# Patient Record
Sex: FEMALE | Race: WHITE | NOT HISPANIC OR LATINO | Employment: PART TIME | URBAN - METROPOLITAN AREA
[De-identification: names, ages, dates, MRNs, and addresses within clinical notes are randomized per-mention and may not be internally consistent; named-entity substitution may affect disease eponyms.]

---

## 2018-05-07 ENCOUNTER — OFFICE VISIT (OUTPATIENT)
Dept: OTHER | Facility: IMAGING CENTER | Age: 23
End: 2018-05-07
Payer: COMMERCIAL

## 2018-05-07 VITALS
HEART RATE: 78 BPM | TEMPERATURE: 97.6 F | OXYGEN SATURATION: 100 % | DIASTOLIC BLOOD PRESSURE: 64 MMHG | WEIGHT: 186.07 LBS | BODY MASS INDEX: 29.2 KG/M2 | RESPIRATION RATE: 14 BRPM | SYSTOLIC BLOOD PRESSURE: 110 MMHG | HEIGHT: 67 IN

## 2018-05-07 DIAGNOSIS — F41.9 ANXIETY: ICD-10-CM

## 2018-05-07 DIAGNOSIS — Z00.00 HEALTHCARE MAINTENANCE: ICD-10-CM

## 2018-05-07 DIAGNOSIS — D24.2 BENIGN PHYLLODES TUMOR OF BREAST, LEFT: ICD-10-CM

## 2018-05-07 DIAGNOSIS — E55.9 VITAMIN D DEFICIENCY: ICD-10-CM

## 2018-05-07 PROCEDURE — 99205 OFFICE O/P NEW HI 60 MIN: CPT | Performed by: INTERNAL MEDICINE

## 2018-05-07 ASSESSMENT — PATIENT HEALTH QUESTIONNAIRE - PHQ9
CLINICAL INTERPRETATION OF PHQ2 SCORE: 1
SUM OF ALL RESPONSES TO PHQ QUESTIONS 1-9: 7
5. POOR APPETITE OR OVEREATING: 0 - NOT AT ALL

## 2018-05-07 NOTE — LETTER
[facility]  No primary care provider on file.  No primary provider on file.  Fax: [phone]   Authorization for Release/Disclosure of   Protected Health Information   Name: STONEY ACEVEDO : 1995 SSN: [id number]   Address: [address] [place] [address]   [place] [address] Phone:    [phone] (home)    I authorize the entity listed below to release/disclose the PHI below to:   [facility]/No primary care provider on file. and Soniya Gannon D.O.   Provider or Entity Name: Lima Martinez M.D.     Address   City, State, Zip   Phone:  [phone]    Fax:  [phone]   Reason for request: continuity of care   Information to be released:    [  ] LAST COLONOSCOPY,  including any PATH REPORT and follow-up  [  ] LAST FIT/COLOGUARD RESULT [  ] LAST DEXA  [  ] LAST MAMMOGRAM  [  ] LAST PAP  [  ] LAST LABS [  ] RETINA EXAM REPORT  [  ] IMMUNIZATION RECORDS  [ X ] Release all info      [  ] Check here and initial the line next to each item to release ALL health information INCLUDING  _____ Care and treatment for drug and / or alcohol abuse  _____ HIV testing, infection status, or AIDS  _____ Genetic Testing    DATES OF SERVICE OR TIME PERIOD TO BE DISCLOSED: _____________  I understand and acknowledge that:  * This Authorization may be revoked at any time by you in writing, except if your health information has already been used or disclosed.  * Your health information that will be used or disclosed as a result of you signing this authorization could be re-disclosed by the recipient. If this occurs, your re-disclosed health information may no longer be protected by State or Federal laws.  * You may refuse to sign this Authorization. Your refusal will not affect your ability to obtain treatment.  * This Authorization becomes effective upon signing and will  on (date) __________.      If no date is indicated, this Authorization will  one (1) year from the signature date.    Name: Stoney Acevedo    Signature:   Date:     5/7/2018       PLEASE FAX REQUESTED RECORDS BACK TO: [phone]

## 2018-05-07 NOTE — ASSESSMENT & PLAN NOTE
S/P excision surgery x 2 Dec 2016 by Dr. Martinez (surgery). Family history of breast cancer in maternal grandmother.

## 2018-05-07 NOTE — PROGRESS NOTES
"Chief Complaint   Patient presents with   • Establish Care   • Anxiety     no previous treatments, more severe over the last 6 months      Hoa Acevedo is a 22 y.o. female who usually sees No primary care provider on file. presents today to establish care at [facility] and to discuss anxiety    HPI:  Anxiety  Patient reports a history of anxiety which has been going on most of her life. Patient experiences excessive worrying daily. Used to have panic attacks. During high school she states that she would trust problem with drinking and partying. She says she has realized that this is not a good way to cope and has cut back on drinking. Last month feels her anxiety has been more severe. Emotional state has been up and down with some panic attacks. Possible trigger including a situation with one of her friends who didn't seem to respond well when she was talking about her anxiety and other issues. Patient's grandmother also recently diagnosed with a possible stroke. Patient also has been taking on more responsibilities at work. She does experience some restlessness and muscle tension in her neck as well as sleep disturbance-feels that she is not getting good sleep. She has mentioned this to a health provider who had done some labs on her which she remembers as being negative except for a vitamin D deficiency. She feels that she is a hypochondriac at times. She has also switched to birth control pills-was previously on IUD.    Healthcare maintenance  PAP 2018, Dr. Thorne  Lifestyle  Exercise: Not regular - used to do yoga. On feet all day at work, up and down lots of stairs, heavy lifting.   Diet: Breakfast hit or miss, Lunch (hard-boiled eggs and fruit or veggies), Dinner (protein, side of veggies or rice/potatoes), snack (work around food so she can snack on slice of pizza or whatever special they test out that day), beverages (coffee 1-2c/day, teas).   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  Relationships: live with parents, stay with boyfriend a lot, solid friend group, lots of outlet and good support. Has a valarie. Doesn't know why she is struggling. She is open in spirituality.   Current Stressors: too much work, not enough money, no motivation or time for self care.    Benign phyllodes tumor of breast, left  S/P excision surgery x 2 Dec 2016 by Dr. Martinez (surgery). Family history of breast cancer in maternal grandmother.    Current medicines (including changes today)  Current Outpatient Prescriptions   Medication Sig Dispense Refill   • Norethin-Eth Estrad-Fe Biphas (LO LOESTRIN FE PO) Take  by mouth.     • PREBIOTIC VAGINAL PRODUCTS VA Insert  in vagina.       No current facility-administered medications for this visit.      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  She  has a past surgical history that includes lumpectomy and dental extraction(s).  Social History   Substance Use Topics   • Smoking status: Current Some Day Smoker     Types: Cigarettes   • Smokeless tobacco: Never Used      Comment: couple time every other week , started at 15 yr   • Alcohol use Yes      Comment: 1 drink per week and more socially      Family History   Problem Relation Age of Onset   • Breast Cancer Maternal Grandmother 78     also possible stroke   • Anemia Mother    • GI Father      intestinal issues   • Other Sister      miscarriage   • No Known Problems Brother    • Other Maternal Grandfather      tumors in heart   • Lung Cancer Paternal Grandmother    • Lung Cancer Paternal Grandfather      Family Status   Relation Status   • Maternal Grandmother Alive    Breast cancer at age 78      Social History     Social History Narrative   • No narrative on file     ROS  Constitutional: Negative for fever, chills, weight loss  "and malaise/fatigue.   HENT: Negative for ear pain, nosebleeds, congestion, sore throat and neck pain.    Eyes: Negative for blurred vision.   Respiratory: Negative for cough, sputum production, shortness of breath and wheezing.    Cardiovascular: Negative for chest pain, palpitations, orthopnea and leg swelling.   Gastrointestinal: Negative for heartburn, nausea, vomiting and abdominal pain.   Genitourinary: Negative for dysuria, urgency and frequency.   Musculoskeletal: Negative for myalgias, back pain and joint pain.   Skin: Negative for rash and itching.   Neurological: Negative for dizziness, tingling, tremors, sensory change, focal weakness and headaches.   Endo/Heme/Allergies: Does not bruise/bleed easily.   Psychiatric/Behavioral: As per hpi.  All other systems reviewed and are negative except as in HPI.  Labs reviewed with patient:     Objective:   Blood pressure 110/64, pulse 78, temperature 36.4 °C (97.6 °F), resp. rate 14, height 1.702 m (5' 7\"), weight 84.4 kg (186 lb 1.1 oz), last menstrual period 04/10/2018, SpO2 100 %, not currently breastfeeding. Body mass index is 29.14 kg/m².  Physical Exam:  Constitutional: Alert, no distress.  Skin: Warm, dry, good turgor, no rashes in visible areas.  Eye: Equal, round and reactive, conjunctiva clear, lids normal.  ENMT: Lips without lesions, good dentition, oropharynx clear.  Neck: Trachea midline, no masses, no thyromegaly.  Respiratory: Unlabored respiratory effort, lungs clear to auscultation, no wheezes, no ronchi.  Cardiovascular: Normal S1, S2, no murmur, no edema.  Abdomen: Soft, non-tender, no masses, no hepatosplenomegaly.  Psych: Alert and oriented x3, normal affect and mood.    Assessment and Plan:   The following treatment plan was discussed  1. Anxiety  Meets DSM-5 criteria for RACHAEL. R/O metabolic condition that can mimic anxiety. Patient declines prescription medications. Discussed adding a regular exercise routine back into her schedule.  " Discussed maximizing nutrition to include foods rich in omega 3-fatty acids. Advised starting a B-complex vitamin with folic acid as B6 can be depleted in women taking OCP which could contribute to mood changes. Discussed avoiding caffeine and excess ETOH consumption of which long-term use has been associated with anxiety - switch to green tea. Discussed maintaining work-life balance, patient is aware she has a tendency to take on more than she needs to handle and will keep this in mind when she makes her future schedules. She is looking forward to vacation in June. Discussed incorporating daily mind-body therapy such as meditation - information about guided imagery given. Discussed of kava kava for 4-6 weeks or valerian root for 6 wks.   Consider referral to  specialist if symptoms not improving or worsening.   Acupuncture treatment in office today: GV 20, Arian araujo, B/L Eddie victor, B/L edvin ventura rx, Sp 3.2, Kd 27, Kd 7, Oketsu rx  - COMP METABOLIC PANEL; Future  - TSH WITH REFLEX TO FT4; Future    2. Benign phyllodes tumor of breast, left  F/U with Dr. Martinez as scheduled.     3. Vitamin D deficiency  Recheck vitamin D levels. Had been low in the past. Not currently taking a supplement.  - VITAMIN D,25 HYDROXY; Future    4. Healthcare maintenance  Discussed CDC recommendations for immunizations and USPSTF guidelines for screening exams. Advised attending Food is Medicine lectures - intro lecture.   - COMP METABOLIC PANEL; Future  - TSH WITH REFLEX TO FT4; Future  - CBC WITH DIFFERENTIAL; Future    Records requested.  Followup: Return in about 2 months (around 7/7/2018), or if symptoms worsen or fail to improve, for establish appointment with new provider..     Spent 60 minutes in face-to-tace patient contact in which greater than 50% of the visit was spent in counseling and coordination of care including anxiety medication management options. Discussed sleep hygiene in detail. Discussed the benefits and utility of  acupuncture.  I answered patient questions about efficacy, safety, and what to expect during a treatment, and the likely number of treatments needed. We also reviewed PMH, family history, and each of her chronic diagnoses in regards to current management, specialty physicians, symptom control, and future planning.  Please refer to assessment and plan/discussion/recommendations for additional details.          I have worked with consultants from the vendor as well as technical experts from [employer] to optimize the interface. I have made every reasonable attempt to correct obvious errors, but I expect that there are errors of grammar and possibly content that I did not discover before finalizing the note.

## 2018-05-07 NOTE — LETTER
[facility]  No primary care provider on file.  No primary provider on file.  Fax: [phone]   Authorization for Release/Disclosure of   Protected Health Information   Name: STONEY LAUGHLIN : 1995 SSN: [id number]   Address: [address] [place] [address] [address] [place] [address] Phone:    [phone] (home)    I authorize the entity listed below to release/disclose the PHI below to:   [facility]/No primary care provider on file. and Soniya Gannon D.O.   Provider or Entity Name: Eden Thorne M.D.   Address   City, State, Zip   Phone:      Fax:  [phone]   Reason for request: continuity of care   Information to be released:    [  ] LAST COLONOSCOPY,  including any PATH REPORT and follow-up  [  ] LAST FIT/COLOGUARD RESULT [  ] LAST DEXA  [  ] LAST MAMMOGRAM  [  ] LAST PAP  [  ] LAST LABS [  ] RETINA EXAM REPORT  [  ] IMMUNIZATION RECORDS  [  ] Release all info      [  ] Check here and initial the line next to each item to release ALL health information INCLUDING  _____ Care and treatment for drug and / or alcohol abuse  _____ HIV testing, infection status, or AIDS  _____ Genetic Testing    DATES OF SERVICE OR TIME PERIOD TO BE DISCLOSED: _____________  I understand and acknowledge that:  * This Authorization may be revoked at any time by you in writing, except if your health information has already been used or disclosed.  * Your health information that will be used or disclosed as a result of you signing this authorization could be re-disclosed by the recipient. If this occurs, your re-disclosed health information may no longer be protected by State or Federal laws.  * You may refuse to sign this Authorization. Your refusal will not affect your ability to obtain treatment.  * This Authorization becomes effective upon signing and will  on (date) __________.      If no date is indicated, this Authorization will  one (1) year from the signature date.    Name: Stoney Laughlin    Signature:   Date:          5/7/2018       PLEASE FAX REQUESTED RECORDS BACK TO: [phone]

## 2018-05-07 NOTE — PATIENT INSTRUCTIONS
What can I do to improve my insomnia? -- You can follow good “sleep hygiene.” That means that you:    ?Sleep only long enough to feel rested and then get out of bed  ?Go to bed and get up at the same time every day  ?Do not try to force yourself to sleep. If you can't sleep, get out of bed and try again later.  ?Have coffee, tea, and other foods that have caffeine only in the morning  ?Avoid alcohol in the late afternoon, evening, and bedtime  ?Avoid smoking, especially in the evening  ?Keep your bedroom dark, cool, quiet, and free of reminders of work or other things that cause you stress  ?Solve problems you have before you go to bed  ?Exercise several days a week, but not right before bed  ?Avoid looking at phones or reading devices (“e-books”) that give off light before bed. This can make it harder to fall asleep.    Other things that can improve sleep include:  ?Relaxation therapy, in which you focus on relaxing all the muscles in your body 1 by 1  ?Working with a counselor or psychologist to deal with the problems that might be causing poor sleep.

## 2018-05-07 NOTE — LETTER
[facility]  No primary care provider on file.  No primary provider on file.  Fax: [phone]   Authorization for Release/Disclosure of   Protected Health Information   Name: STONEY ACEVEDO : 1995 SSN: [id number]   Address: [address] [place] [address] [address] [place] [address] Phone:    [phone] (home)    I authorize the entity listed below to release/disclose the PHI below to:   [facility]/No primary care provider on file. and Soniya Gannon D.O.   Provider or Entity Name:  [facility]    Address   City, [facility]   Phone:      Fax:     Reason for request: continuity of care   Information to be released:    [  ] LAST COLONOSCOPY,  including any PATH REPORT and follow-up  [  ] LAST FIT/COLOGUARD RESULT [  ] LAST DEXA  [X] LAST MAMMOGRAM  [  ] LAST PAP  [  ] LAST LABS [  ] RETINA EXAM REPORT  [  ] IMMUNIZATION RECORDS  [ X ] Release all info      [  ] Check here and initial the line next to each item to release ALL health information INCLUDING  _____ Care and treatment for drug and / or alcohol abuse  _____ HIV testing, infection status, or AIDS  _____ Genetic Testing    DATES OF SERVICE OR TIME PERIOD TO BE DISCLOSED: _____________  I understand and acknowledge that:  * This Authorization may be revoked at any time by you in writing, except if your health information has already been used or disclosed.  * Your health information that will be used or disclosed as a result of you signing this authorization could be re-disclosed by the recipient. If this occurs, your re-disclosed health information may no longer be protected by State or Federal laws.  * You may refuse to sign this Authorization. Your refusal will not affect your ability to obtain treatment.  * This Authorization becomes effective upon signing and will  on (date) __________.      If no date is indicated, this Authorization will  one (1) year from the signature date.    Name: Stoney Acevedo    Signature:   Date:     2018            PLEASE FAX REQUESTED RECORDS BACK TO: [phone]

## 2018-05-07 NOTE — ASSESSMENT & PLAN NOTE
Patient reports a history of anxiety which has been going on most of her life. Patient experiences excessive worrying daily. Used to have panic attacks. During high school she states that she would trust problem with drinking and partying. She says she has realized that this is not a good way to cope and has cut back on drinking. Last month feels her anxiety has been more severe. Emotional state has been up and down with some panic attacks. Possible trigger including a situation with one of her friends who didn't seem to respond well when she was talking about her anxiety and other issues. Patient's grandmother also recently diagnosed with a possible stroke. Patient also has been taking on more responsibilities at work. She does experience some restlessness and muscle tension in her neck as well as sleep disturbance-feels that she is not getting good sleep. She has mentioned this to a health provider who had done some labs on her which she remembers as being negative except for a vitamin D deficiency. She feels that she is a hypochondriac at times. She has also switched to birth control pills-was previously on IUD.

## 2018-05-07 NOTE — ASSESSMENT & PLAN NOTE
"PAP 2018, Dr. Thorne  Lifestyle  Exercise: Not regular - used to do yoga. On feet all day at work, up and down lots of stairs, heavy lifting.   Diet: Breakfast hit or miss, Lunch (hard-boiled eggs and fruit or veggies), Dinner (protein, side of veggies or rice/potatoes), snack (work around food so she can snack on slice of pizza or whatever special they test out that day), beverages (coffee 1-2c/day, teas).   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  Relationships: live with parents, stay with boyfriend a lot, solid friend group, lots of outlet and good support. Has a valarie. Doesn't know why she is struggling. She is open in spirituality.   Current Stressors: too much work, not enough money, no motivation or time for self care.  "

## 2018-06-04 ENCOUNTER — TELEPHONE (OUTPATIENT)
Dept: OTHER | Facility: IMAGING CENTER | Age: 23
End: 2018-06-04

## 2018-06-04 NOTE — TELEPHONE ENCOUNTER
----- Message from Jacquelin Powell sent at 6/3/2018  3:42 PM PDT -----      ----- Message -----  From: Soniya Gannon D.O.  Sent: 6/1/2018   4:56 PM  To: [facility]    Vitamin D is low at 22 - advise starting vitamin D3 supplement 2000 IU/day. Other labs are within normal limits.

## 2019-01-03 ENCOUNTER — OFFICE VISIT (OUTPATIENT)
Dept: OTHER | Facility: IMAGING CENTER | Age: 24
End: 2019-01-03
Payer: COMMERCIAL

## 2019-01-03 VITALS
BODY MASS INDEX: 26.71 KG/M2 | HEART RATE: 80 BPM | HEIGHT: 67 IN | RESPIRATION RATE: 14 BRPM | SYSTOLIC BLOOD PRESSURE: 110 MMHG | OXYGEN SATURATION: 96 % | TEMPERATURE: 99.2 F | DIASTOLIC BLOOD PRESSURE: 66 MMHG | WEIGHT: 170.19 LBS

## 2019-01-03 DIAGNOSIS — E55.9 VITAMIN D DEFICIENCY: ICD-10-CM

## 2019-01-03 DIAGNOSIS — R68.82 LOW LIBIDO: ICD-10-CM

## 2019-01-03 DIAGNOSIS — F41.9 ANXIETY: ICD-10-CM

## 2019-01-03 PROCEDURE — 99214 OFFICE O/P EST MOD 30 MIN: CPT | Performed by: INTERNAL MEDICINE

## 2019-01-03 RX ORDER — CHOLECALCIFEROL (VITAMIN D3) 125 MCG
CAPSULE ORAL
COMMUNITY

## 2019-01-03 ASSESSMENT — PATIENT HEALTH QUESTIONNAIRE - PHQ9
SUM OF ALL RESPONSES TO PHQ QUESTIONS 1-9: 7
5. POOR APPETITE OR OVEREATING: 0 - NOT AT ALL
CLINICAL INTERPRETATION OF PHQ2 SCORE: 1

## 2019-01-03 NOTE — ASSESSMENT & PLAN NOTE
Interval changes: Overall stable however and not worsening. States that she has found that her anxiety seems to be cyclical and worse prior to menstrual period when she is also more irritable. Has cut back on caffeine but will still have more caffeine when she has busy work day. Using more green tea and essential oils which she feels has helped. She knows her work schedule is sometimes stressful resulting in inconsistent sleep.  Off birth control pills- was on IUD October 2015 which was removed in April last year.  She decided to stop taking it in October.  Currently using condoms for birth control.    She has not had funds to start regular acupuncture but is interested. She has also been taking time to do more exercise recently and also journaling which she finds helpful.  She notes that meditation is very difficult for her still.

## 2019-01-03 NOTE — PROGRESS NOTES
Chief Complaint   Patient presents with   • Anxiety     still present, goes in waves, possible libido    • Vitamin D Deficiency     has been taking vitamin D3     Subjective:   Hoa Acevedo is a 23 y.o. female here today for multiple problems as listed below.      Anxiety  Interval changes: Overall stable however and not worsening. States that she has found that her anxiety seems to be cyclical and worse prior to menstrual period when she is also more irritable. Has cut back on caffeine but will still have more caffeine when she has busy work day. Using more green tea and essential oils which she feels has helped. She knows her work schedule is sometimes stressful resulting in inconsistent sleep.  Off birth control pills- was on IUD October 2015 which was removed in April last year.  She decided to stop taking it in October.  Currently using condoms for birth control.    She has not had funds to start regular acupuncture but is interested. She has also been taking time to do more exercise recently and also journaling which she finds helpful.  She notes that meditation is very difficult for her still.      Vitamin D deficiency  Started vitamin D3 2000 international units daily since May 2018 when levels were low.    She has been tracking her cycle since October-has been usually coming on time within 3 days.  Also aware of other changes such as increasing cervical mucus around time of ovulation.  This was not as apparent when she was on birth control pills. No menorrhagia or cramping. Continues to have low libido however. Some vaginal dryness with lengthy intercourse. Monogamous relationship. Finds difficulty initiating intercourse due to low libido and this seems out of character for her.  She feels that her work schedule will not change and may be a contributing factor as her sleep is inconsistent and will have fatigue which affects her libido.  Recently purchased herbal tincture with jaswinder giron damaina,  "ginseng proprietary blend but has not started it yet.    5/30/2018  Vitamin D 25-Hydroxy 22  Cbc, cmp, tsh wnl    Current medicines (including changes today)  Current Outpatient Prescriptions   Medication Sig Dispense Refill   • Cholecalciferol (VITAMIN D3) 2000 units Tab Take  by mouth.     • Norethin-Eth Estrad-Fe Biphas (LO LOESTRIN FE PO) Take  by mouth.     • PREBIOTIC VAGINAL PRODUCTS VA Insert  in vagina.       No current facility-administered medications for this visit.      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    ROS   No chest pain, no shortness of breath, no abdominal pain, no diarrhea/constipation, no urinary symptoms.     Objective:     Blood pressure 110/66, pulse 80, temperature 37.3 °C (99.2 °F), temperature source Temporal, resp. rate 14, height 1.702 m (5' 7\"), weight 77.2 kg (170 lb 3.1 oz), SpO2 96 %, not currently breastfeeding. Body mass index is 26.66 kg/m².   Physical Exam:  Alert, oriented in no acute distress.  Eye contact is good, speech goal directed, affect calm  HEENT: conjunctiva non-injected, sclera non-icteric.   Neck: No adenopathy or masses in the neck or supraclavicular regions.  Lungs: clear to auscultation bilaterally with good excursion.  CV: regular rate and rhythm.  Ext: no edema, color normal, vascularity normal, temperature normal    Assessment and Plan:   The following treatment plan was discussed  1. Anxiety   Overall stable.  Patient's goal is to avoid prescription medications.  She has been exercising regularly and doing some journaling-advised her to continue.  Continue cutting back on caffeine. If symptoms cyclical, may be related to premenstrual syndrome and addressing this may also help - see #2. Would advise trial of acupuncture but patient states she doesn't have the funds for this for a few months.    2. Low libido   Reviewed ingredients of herbal proprietary blend on " [employer] - main ingredients do have preliminary clinical evidence for effectiveness. Advise trial for 3-4 wks. Discussed use of essential oil blends which may also be helpful. Consider trial of chaste tree berry and saffron if still having symptoms.    3. Vitamin D deficiency   Continue vitamin D supplement 2000 international units daily.  Recheck levels. VITAMIN D,25 HYDROXY     Followup: Return in about 2 months (around 3/3/2019), or if symptoms worsen or fail to improve, for follow-up with PCP.    Spent 25 minutes in face-to-tace patient contact in which greater than 50% of the visit was spent in counseling and coordination of care including reviewing herbal supplement ingredients with patient, discussed benefits of journaling, benefits of acupuncture, assessing patients goals. Please refer to assessment and plan/discussion/recommendations for additional details.        Please note that dictation has been dictated using voice recognition soft ware. I have made every reasonable attempt to correct obvious errors, but I expect that there are errors of grammar and possibly content that I did not discover before finalizing the note.

## 2019-01-21 ENCOUNTER — TELEPHONE (OUTPATIENT)
Dept: OTHER | Facility: IMAGING CENTER | Age: 24
End: 2019-01-21

## 2019-01-21 NOTE — TELEPHONE ENCOUNTER
Voicemail received from pt inqiuring about typhoid/hep A vaccine for upcoming travel to [place]. Returned call to pt, but left voicemail. Hep a vaccine available at our office.

## 2019-01-22 ENCOUNTER — NON-PROVIDER VISIT (OUTPATIENT)
Dept: OTHER | Facility: IMAGING CENTER | Age: 24
End: 2019-01-22
Payer: COMMERCIAL

## 2019-01-22 DIAGNOSIS — Z23 NEED FOR HEPATITIS A VACCINATION: ICD-10-CM

## 2019-01-22 PROCEDURE — 90632 HEPA VACCINE ADULT IM: CPT | Performed by: INTERNAL MEDICINE

## 2019-01-22 PROCEDURE — 90471 IMMUNIZATION ADMIN: CPT | Performed by: INTERNAL MEDICINE

## 2019-01-22 NOTE — PROGRESS NOTES
"Hoa Acevedo is a 23 y.o. female here for a non-provider visit for:   HEPATITIS A 1 of 2    Reason for immunization: Traveling out of the country.  Immunization records indicate need for vaccine: Yes, confirmed with [facility]  Minimum interval has been met for this vaccine: Yes  ABN completed: Not Indicated    Order and dose verified by: BARI Monson  VIS Dated  07/20/2016 was given to patient: Yes  All IAC Questionnaire questions were answered \"No.\"    Patient tolerated injection and no adverse effects were observed or reported: Yes    Pt scheduled for next dose in series: No. Patient aware of next dose needed in 6 months, will schedule then.   "

## 2019-03-28 ENCOUNTER — OFFICE VISIT (OUTPATIENT)
Dept: OTHER | Facility: IMAGING CENTER | Age: 24
End: 2019-03-28
Payer: COMMERCIAL

## 2019-03-28 VITALS
TEMPERATURE: 98.1 F | RESPIRATION RATE: 12 BRPM | OXYGEN SATURATION: 97 % | SYSTOLIC BLOOD PRESSURE: 112 MMHG | WEIGHT: 167.77 LBS | HEIGHT: 67 IN | DIASTOLIC BLOOD PRESSURE: 68 MMHG | BODY MASS INDEX: 26.33 KG/M2 | HEART RATE: 65 BPM

## 2019-03-28 DIAGNOSIS — R53.83 FATIGUE, UNSPECIFIED TYPE: ICD-10-CM

## 2019-03-28 DIAGNOSIS — Z00.00 HEALTH CARE MAINTENANCE: ICD-10-CM

## 2019-03-28 DIAGNOSIS — F41.9 ANXIETY: ICD-10-CM

## 2019-03-28 DIAGNOSIS — R68.82 LOW LIBIDO: ICD-10-CM

## 2019-03-28 DIAGNOSIS — E55.9 VITAMIN D DEFICIENCY: ICD-10-CM

## 2019-03-28 PROCEDURE — 99214 OFFICE O/P EST MOD 30 MIN: CPT | Performed by: INTERNAL MEDICINE

## 2019-03-28 ASSESSMENT — PAIN SCALES - GENERAL: PAINLEVEL: NO PAIN

## 2019-03-28 NOTE — PROGRESS NOTES
Chief Complaint   Patient presents with   • GI Problem     abdominal pain, currently resolved, recent travel, would like stool testing     Subjective:   Hoa Acevedo is a 23 y.o. female here today for multiple problems as listed below.      Anxiety  Interval changes: Symptoms are intermittent and seem to be more circumstantial.  Patient feels that she is recognizing how she responds to certain situations and this seems to help curb some of her anxiety.  She has not had any panic attacks recently.  Has cut back on caffeine but will still have more caffeine when she has busy work day. Using more green tea and essential oils which she feels has helped.  Work schedule continues to be stressful resulting in inconsistent sleep-usually sleeping late still.  Off birth control pills since October 2018. Currently using condoms and timing for birth control.    She has saved up some funds start acupuncture.     Low libido  Interval changes: Notes that her libido does improve when she is ovulating.  Continues to be on tincture which may be improving her symptoms.  Interested in looking at her hormone levels or finding a gynecologist that we will do some more investigation on this end.  Notes that her cycle is overall very regular but will start out spotting for 2 days and then have a heavy period for another 2 days before spotting again another 2 days.  Last menstrual period was 3/20/19.  Family history of PCOS and sister and mom with hysterectomy.  Chronic condition for several months. Some vaginal dryness with lengthy intercourse. Monogamous relationship. Finds difficulty initiating intercourse due to low libido and this seems out of character for her.  She feels that her work schedule will not change and may be a contributing factor as her sleep is inconsistent and will have fatigue which affects her libido.  Recently purchased herbal tincture with bree, shatatimi, damaina, ginseng proprietary blend but has not started it  "yet.     Taking vitamin D 4 times a week.  Some other dietary changes-cut back on bread and increasing her vegetable intake including raw vegetable foods.  Working out more consistently.    Notes that she has been doing more reading about the importance of her got microbiome and is concerned about how this may affect her health and what could be gained from testing.    Current medicines (including changes today)  Current Outpatient Prescriptions   Medication Sig Dispense Refill   • Cholecalciferol (VITAMIN D3) 2000 units Tab Take  by mouth.     • PREBIOTIC VAGINAL PRODUCTS VA Insert  in vagina.       No current facility-administered medications for this visit.      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    ROS  No chest pain, no shortness of breath, no abdominal pain, no diarrhea/constipation, no urinary symptoms.  Notes past history of easy bruising. Some abdominal bloating but varies with her diet.  Bowel movements overall regular consistency once a day (may be less than her usual 1-2 times a day).  Generalized fatigue but not severe.  Sleep late as per HPI.     Objective:     Blood pressure 112/68, pulse 65, temperature 36.7 °C (98.1 °F), resp. rate 12, height 1.702 m (5' 7\"), weight 76.1 kg (167 lb 12.3 oz), last menstrual period 03/20/2019, SpO2 97 %, not currently breastfeeding. Body mass index is 26.28 kg/m².   Physical Exam:  Alert, oriented in no acute distress.  Eye contact is good, speech goal directed, affect calm  HEENT: conjunctiva non-injected, sclera non-icteric.    Neck: No adenopathy or masses in the neck or supraclavicular regions.  Lungs: clear to auscultation bilaterally with good excursion.  CV: regular rate and rhythm.  Abdomen: soft, nontender, No CVAT  Ext: no edema, color normal, vascularity normal, temperature normal    Assessment and Plan:   The following treatment plan was discussed   1. Anxiety  TSH WITH REFLEX TO FT4 "   2. Low libido  TSH WITH REFLEX TO FT4    FERRITIN    SEX HORMONE BINDING GLOBULIN    PROGESTERONE    ESTRONE    TESTOSTERONE, FREE, FEMALES/CHILDREN    DHEA SULFATE    ESTRADIOL   3. Vitamin D deficiency  VITAMIN D,25 HYDROXY   4. Fatigue, unspecified type  CBC WITH DIFFERENTIAL    TSH WITH REFLEX TO FT4    FERRITIN   5. Health care maintenance  Comp Metabolic Panel    CBC WITH DIFFERENTIAL    TSH WITH REFLEX TO FT4    Lipid Profile    FERRITIN     1.  Slightly improved-patient's goal is to avoid prescription medications.  Continue exercising regularly and journaling.  Agree with trial of acupuncture.  2.  Continues to be a recurrent/persisting problem causing personal distress and/or interpersonal difficulty.  Suspect fatigue and stress are largely contributing factors - addressing these may improve symptoms.  Possibly some improvements on herbal proprietary blend.  Discussed limitations to hormone level evaluation which may give some additional information.  Last menstrual period was 3/20/19.  3.  Advised more consistency with daily vitamin D supplement.  Discussed optimizing levels to at least 40.   4.  Rule out anemia and other endocrine/nutritional deficiency.  Late sleep schedule is likely contributing factor and increased stress at work.  5.  No recent baseline lab work.      Followup: Return in about 1 month (around 4/28/2019), or if symptoms worsen or fail to improve, for follow-up with PCP, Lab review.    Spent 40 minutes in face-to-tace patient contact in which greater than 50% of the visit was spent in counseling and coordination of care including discussion of the benefits and utility of acupuncture.  I answered patient questions about efficacy, safety, and what to expect during a treatment, and the likely number of treatments needed.  Also discussed importance of got micro-biome on general health, benefits of TCM evaluation and possible functional medicine lab workup which she can discuss in more  detail with Dr. Murdock.  Please refer to assessment and plan/discussion/recommendations for additional details.        Please note that dictation has been dictated using voice recognition soft ware. I have made every reasonable attempt to correct obvious errors, but I expect that there are errors of grammar and possibly content that I did not discover before finalizing the note.

## 2019-03-29 NOTE — ASSESSMENT & PLAN NOTE
Interval changes: Notes that her libido does improve when she is ovulating.  Continues to be on tincture which may be improving her symptoms.  Interested in looking at her hormone levels or finding a gynecologist that we will do some more investigation on this end.  Notes that her cycle is overall very regular but will start out spotting for 2 days and then have a heavy period for another 2 days before spotting again another 2 days.  Last menstrual period was 3/20/19.  Family history of PCOS and sister and mom with hysterectomy.  Chronic condition for several months. Some vaginal dryness with lengthy intercourse. Monogamous relationship. Finds difficulty initiating intercourse due to low libido and this seems out of character for her.  She feels that her work schedule will not change and may be a contributing factor as her sleep is inconsistent and will have fatigue which affects her libido.  Recently purchased herbal tincture with bree, shatavari, damaina, ginseng proprietary blend but has not started it yet.

## 2019-03-29 NOTE — ASSESSMENT & PLAN NOTE
Interval changes: Symptoms are intermittent and seem to be more circumstantial.  Patient feels that she is recognizing how she responds to certain situations and this seems to help curb some of her anxiety.  She has not had any panic attacks recently.  Has cut back on caffeine but will still have more caffeine when she has busy work day. Using more green tea and essential oils which she feels has helped.  Work schedule continues to be stressful resulting in inconsistent sleep-usually sleeping late still.  Off birth control pills since October 2018. Currently using condoms and timing for birth control.    She has saved up some funds start acupuncture.

## 2019-04-16 DIAGNOSIS — Z00.00 HEALTH CARE MAINTENANCE: ICD-10-CM

## 2019-04-16 DIAGNOSIS — E55.9 VITAMIN D DEFICIENCY: ICD-10-CM

## 2019-04-16 DIAGNOSIS — F41.9 ANXIETY: ICD-10-CM

## 2019-04-16 DIAGNOSIS — R68.82 LOW LIBIDO: ICD-10-CM

## 2019-04-16 DIAGNOSIS — R53.83 FATIGUE, UNSPECIFIED TYPE: ICD-10-CM

## 2019-04-26 ENCOUNTER — APPOINTMENT (OUTPATIENT)
Dept: MEDICAL GROUP | Facility: IMAGING CENTER | Age: 24
End: 2019-04-26

## 2019-09-03 ENCOUNTER — OFFICE VISIT (OUTPATIENT)
Dept: MEDICAL GROUP | Facility: IMAGING CENTER | Age: 24
End: 2019-09-03
Payer: COMMERCIAL

## 2019-09-03 VITALS
DIASTOLIC BLOOD PRESSURE: 70 MMHG | OXYGEN SATURATION: 95 % | RESPIRATION RATE: 14 BRPM | HEIGHT: 67 IN | WEIGHT: 161.2 LBS | HEART RATE: 73 BPM | SYSTOLIC BLOOD PRESSURE: 112 MMHG | TEMPERATURE: 98.2 F | BODY MASS INDEX: 25.3 KG/M2

## 2019-09-03 DIAGNOSIS — L30.9 DERMATITIS: ICD-10-CM

## 2019-09-03 PROCEDURE — 99213 OFFICE O/P EST LOW 20 MIN: CPT | Performed by: INTERNAL MEDICINE

## 2019-09-03 RX ORDER — METHYLPREDNISOLONE 4 MG/1
TABLET ORAL
Qty: 21 TAB | Refills: 0 | Status: SHIPPED | OUTPATIENT
Start: 2019-09-03 | End: 2022-03-01

## 2019-09-03 NOTE — PROGRESS NOTES
"Chief Complaint   Patient presents with   • Rash     x 1-2 days, on hands, legs and near butt   • Itchy     spots on hands      Subjective:   Hoa Acevedo is a 24 y.o. female here today for multiple problems as listed below.      Bumpy rash on legs and near buttocks.  More on hands. Also on hands (pruritic). Waking up today - itching.  As the day going more irritating/itchy. Not pruritic on buttock area. Concerned about scabies. Will be leaving for wedding tomorrow.   Onset: Sunday when packing up.   Treatment. Shower.  Good bar soaps (same she has used in the past).   Boyfriend had bumps on feet but cleared up.   Pertinent ROS: Recently at [place] x 1 week, returned about 3 days ago.   Did have some different foods while there. Hygiene not so great. Used baby as hand wipes often.      Current medicines (including changes today)  Current Outpatient Medications   Medication Sig Dispense Refill   • methylPREDNISolone (MEDROL DOSEPAK) 4 MG Tablet Therapy Pack As directed on the packaging label. 21 Tab 0   • Cholecalciferol (VITAMIN D3) 2000 units Tab Take  by mouth.     • PREBIOTIC VAGINAL PRODUCTS VA Insert  in vagina.       No current facility-administered medications for this visit.      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    ROS  No chest pain, no shortness of breath, no abdominal pain, no diarrhea/constipation, no urinary symptoms.     Objective:     /70 (BP Location: Left arm, Patient Position: Sitting, BP Cuff Size: Adult)   Pulse 73   Temp 36.8 °C (98.2 °F) (Temporal)   Resp 14   Ht 1.702 m (5' 7\")   Wt 73.1 kg (161 lb 3.2 oz)   SpO2 95%  Body mass index is 25.25 kg/m².   Physical Exam:  Alert, oriented in no acute distress.  Eye contact is good, speech goal directed, affect calm  Skin: erythematous scattered papules surrounding gluteal folds and few along inner thigh. Palmar surface of hands with erythematous non-raised " lesions scattered distribution, no lesions between hand webs or at wrist crease.     Assessment and Plan:   The following treatment plan was discussed   1. Dermatitis  methylPREDNISolone (MEDROL DOSEPAK) 4 MG Tablet Therapy Pack   Based on history and appearance of lesions, unlikely scabies. Contact/allergic dermatitis possible. Discussed conservative care and use of antihistamines and topical corticosteroids and other OTC meds for symptomatic relief. May consider short-term oral steroid if she is very symptomatic while travelling for wedding. Discussed precautions, medication side effects.     Followup: Return if symptoms worsen or fail to improve, for establish appointment with new provider.           Please note that dictation has been dictated using voice recognition soft ware. I have made every reasonable attempt to correct obvious errors, but I expect that there are errors of grammar and possibly content that I did not discover before finalizing the note.

## 2019-11-20 ENCOUNTER — OFFICE VISIT (OUTPATIENT)
Dept: MEDICAL GROUP | Facility: IMAGING CENTER | Age: 24
End: 2019-11-20
Payer: COMMERCIAL

## 2019-11-20 VITALS
OXYGEN SATURATION: 98 % | SYSTOLIC BLOOD PRESSURE: 108 MMHG | BODY MASS INDEX: 25.33 KG/M2 | HEART RATE: 70 BPM | DIASTOLIC BLOOD PRESSURE: 62 MMHG | TEMPERATURE: 98 F | RESPIRATION RATE: 18 BRPM | WEIGHT: 161.4 LBS | HEIGHT: 67 IN

## 2019-11-20 DIAGNOSIS — L72.3 SEBACEOUS CYST: ICD-10-CM

## 2019-11-20 PROCEDURE — 10060 I&D ABSCESS SIMPLE/SINGLE: CPT | Performed by: FAMILY MEDICINE

## 2019-11-20 ASSESSMENT — PAIN SCALES - GENERAL: PAINLEVEL: 6=MODERATE PAIN

## 2019-11-20 NOTE — PROGRESS NOTES
Subjective:     CC:    Chief Complaint   Patient presents with   • Establish Care   • Cyst     right shoulder. pain with movement. first noticed it 1 years old. Drained at age 20, and now coming back again.        HISTORY OF THE PRESENT ILLNESS: This pleasant 24 y.o. female is here to establish care and discuss cyst below her right shoulder.  This was drained at an urgent care clinic several years ago.  At that time they put it drain in it because the patient was leaving the country.  And was unable to follow-up for repacking.  Patient denies any bleeding disorders, fevers, chills, fatigue.  There is a little bit of irritation at the top with redness from her bra strap however no other signs of infection.    Allergies: Patient has no known allergies.    Current Outpatient Medications Ordered in Epic   Medication Sig Dispense Refill   • Cholecalciferol (VITAMIN D3) 2000 units Tab Take  by mouth.     • PREBIOTIC VAGINAL PRODUCTS VA Insert  in vagina.     • methylPREDNISolone (MEDROL DOSEPAK) 4 MG Tablet Therapy Pack As directed on the packaging label. (Patient not taking: Reported on 11/20/2019) 21 Tab 0     No current Epic-ordered facility-administered medications on file.        Past Medical History:   Diagnosis Date   • Phyllodes tumor, benign, left 12/2016    excised   • Vitamin D deficiency 2015       Past Surgical History:   Procedure Laterality Date   • DENTAL EXTRACTION(S)      highschool   • LUMPECTOMY      phyliodes tumors removed from left breast 2016       Social History     Tobacco Use   • Smoking status: Former Smoker     Packs/day: 0.00     Types: Cigarettes   • Smokeless tobacco: Never Used   • Tobacco comment: occasionally    Substance Use Topics   • Alcohol use: Yes     Comment: 1 drink per week and more socially    • Drug use: No       Social History     Patient does not qualify to have social determinant information on file (likely too young).   Social History Narrative   • Not on file       Family  "History   Problem Relation Age of Onset   • Breast Cancer Maternal Grandmother 78        also possible stroke   • Anemia Mother         hysterectomy   • GI Disease Father         intestinal issues   • Other Sister         miscarriage, PCOS   • No Known Problems Brother    • Other Maternal Grandfather         tumors in heart   • Lung Cancer Paternal Grandmother    • Lung Cancer Paternal Grandfather        ROS:   Gen: no fevers/chills, no changes in weight  Eyes: no changes in vision  ENT: no sore throat, no hearing loss  Pulm: no sob, no cough  CV: no chest pain, no palpitations  GI: no nausea/vomiting, no diarrhea  : no dysuria  MSk: no myalgias  Skin: no rash  Neuro: no headaches, no numbness/tingling  Heme/Lymph: no easy bruising      Objective:     Exam: /62 (BP Location: Left arm, Patient Position: Sitting, BP Cuff Size: Adult)   Pulse 70   Temp 36.7 °C (98 °F) (Temporal)   Resp 18   Ht 1.702 m (5' 7\")   Wt 73.2 kg (161 lb 6.4 oz)   SpO2 98%  Body mass index is 25.28 kg/m².  I and D  Date/Time: 11/20/2019 1:05 PM  Performed by: Felicia Yeboah M.D.  Authorized by: Felicia Yeboah M.D.   Type: cyst  Body area: trunk  Location details: chest  Anesthesia: local infiltration    Anesthesia:  Local Anesthetic: lidocaine 1% with epinephrine  Anesthetic total: 0.5 mL    Sedation:  Patient sedated: no    Scalpel size: 15  Needle gauge: 22  Incision type: single straight  Incision depth: dermal  Complexity: simple  Drainage: purulent  Drainage amount: copious  Wound treatment: wound left open  Packing material: Vaseline gauze  Patient tolerance: Patient tolerated the procedure well with no immediate complications  Comments: She will soak the wound in water and soak several times per day until it closes up.  She will call the office if she has any questions.  To look for signs of infection such as redness around the site purulent drainage fevers chills malodorous        Assessment & Plan:   24 y.o. " female with the following -    Problem List Items Addressed This Visit     Sebaceous cyst        Problem   Sebaceous Cyst    Which consisted of encapsulated thick sebaceous material and some purulent discharge.   She tolerated the procedure well there was no blood loss  She will soak the area and soap and water several times per day until the wound closes  We discussed the signs of infection reasons to come back to the office patient expresses understanding  She understands her may be scar and cyst may reform       Return if symptoms worsen or fail to improve.    Please note that this dictation was created using voice recognition software. I have made every reasonable attempt to correct obvious errors, but I expect that there are errors of grammar and possibly content that I did not discover before finalizing the note.

## 2022-03-01 ENCOUNTER — OFFICE VISIT (OUTPATIENT)
Dept: MEDICAL GROUP | Facility: MEDICAL CENTER | Age: 27
End: 2022-03-01
Payer: COMMERCIAL

## 2022-03-01 VITALS
OXYGEN SATURATION: 99 % | WEIGHT: 185 LBS | BODY MASS INDEX: 29.73 KG/M2 | TEMPERATURE: 97.4 F | HEART RATE: 90 BPM | SYSTOLIC BLOOD PRESSURE: 118 MMHG | HEIGHT: 66 IN | DIASTOLIC BLOOD PRESSURE: 70 MMHG

## 2022-03-01 DIAGNOSIS — E66.9 OBESITY (BMI 35.0-39.9 WITHOUT COMORBIDITY): ICD-10-CM

## 2022-03-01 DIAGNOSIS — F41.9 ANXIETY: ICD-10-CM

## 2022-03-01 DIAGNOSIS — R63.5 WEIGHT GAIN: ICD-10-CM

## 2022-03-01 DIAGNOSIS — E55.9 VITAMIN D DEFICIENCY: ICD-10-CM

## 2022-03-01 DIAGNOSIS — R09.82 POST-NASAL DRIP: ICD-10-CM

## 2022-03-01 DIAGNOSIS — Z00.00 WELL ADULT EXAM: ICD-10-CM

## 2022-03-01 DIAGNOSIS — J34.2 DEVIATED NASAL SEPTUM: ICD-10-CM

## 2022-03-01 DIAGNOSIS — G47.30 SLEEP-RELATED BREATHING DISORDER: ICD-10-CM

## 2022-03-01 PROBLEM — R68.82 LOW LIBIDO: Status: RESOLVED | Noted: 2019-03-28 | Resolved: 2022-03-01

## 2022-03-01 PROCEDURE — 99395 PREV VISIT EST AGE 18-39: CPT | Performed by: INTERNAL MEDICINE

## 2022-03-01 PROCEDURE — 99213 OFFICE O/P EST LOW 20 MIN: CPT | Mod: 25 | Performed by: INTERNAL MEDICINE

## 2022-03-01 ASSESSMENT — PATIENT HEALTH QUESTIONNAIRE - PHQ9: CLINICAL INTERPRETATION OF PHQ2 SCORE: 0

## 2022-03-02 NOTE — PROGRESS NOTES
New Patient to Establish      CC: Deviated nasal septum, possible sleep apnea    HPI:   Hoa is a 26 y.o. female who came into clinic for above.    She used to see integrative medicine provider who is no longer available.    She is here to discuss about deviated nasal septum which she has known for years. But she is having snoring, difficulty breathing during physical activity, waking up with gasping for air.    She suspects that she has sleep apnea although this has not been tested.  She would like to see ENT for a deviated septum first to see if correcting this will resolve the symptoms.    She was treated for anxiety and depression with fluoxetine when she was in [employer]. She got herself off it and focusing on lifestyle modification to manage her anxiety.  She has lifelong anxiety.            ROS:   As above    Patient Active Problem List    Diagnosis Date Noted   • Deviated nasal septum 03/01/2022   • Sebaceous cyst 11/20/2019   • Vitamin D deficiency 05/07/2018   • Anxiety 05/07/2018   • Healthcare maintenance 05/07/2018   • Benign phyllodes tumor of breast, left 05/07/2018       Past Medical History:   Diagnosis Date   • Phyllodes tumor, benign, left 12/2016    excised   • Vitamin D deficiency 2015       Current Outpatient Medications   Medication Sig Dispense Refill   • Cholecalciferol (VITAMIN D3) 2000 units Tab Take  by mouth.       No current facility-administered medications for this visit.       Allergies as of 03/01/2022   • (No Known Allergies)       Social History     Socioeconomic History   • Marital status: Single     Spouse name: Not on file   • Number of children: Not on file   • Years of education: Not on file   • Highest education level: Not on file   Occupational History   • Occupation: /[employer]     Comment: [employer]   • Occupation:      Employer: [employer]   • Occupation: /     Comment: caterings   Tobacco Use   • Smoking status: Former Smoker      "Packs/day: 0.00     Types: Cigarettes   • Smokeless tobacco: Never Used   • Tobacco comment: occasionally    Vaping Use   • Vaping Use: Every day   Substance and Sexual Activity   • Alcohol use: Yes     Comment: 1 drink per week and more socially    • Drug use: No   • Sexual activity: Yes     Partners: Male   Other Topics Concern   • Not on file   Social History Narrative    Works as      Social Determinants of Health     Financial Resource Strain: Not on file   Food Insecurity: Not on file   Transportation Needs: Not on file   Physical Activity: Not on file   Stress: Not on file   Social Connections: Not on file   Intimate Partner Violence: Not on file   Housing Stability: Not on file       Family History   Problem Relation Age of Onset   • Breast Cancer Maternal Grandmother 78        also possible stroke   • Anemia Mother         hysterectomy   • GI Disease Father         intestinal issues   • Other Sister         miscarriage, PCOS   • No Known Problems Brother    • Other Maternal Grandfather         tumors in heart   • Lung Cancer Paternal Grandmother    • Lung Cancer Paternal Grandfather        Past Surgical History:   Procedure Laterality Date   • DENTAL EXTRACTION(S)      highschool   • LUMPECTOMY      phyliodes tumors removed from left breast 2016         /70 (BP Location: Right arm, Patient Position: Sitting, BP Cuff Size: Adult)   Pulse 90   Temp 36.3 °C (97.4 °F)   Ht 1.676 m (5' 6\")   Wt 83.9 kg (185 lb)   SpO2 99%   BMI 29.86 kg/m²     Physical Exam  General: Alert and oriented, No apparent distress.  Eyes: Pupils are equal and reactive. No scleral icterus.  Deviated nasal septum.  Throat: Clear no exudates noted.  Posterior pharynx erythema with enlarged tonsils.  Neck: Supple.  Left anterior cervical lymphadenopathy which is mildly tender about 2x2 cm. Thyroid not enlarged.  Lungs: Clear to auscultation bilaterally without any wheezing, crepitations.  Cardiovascular: Regular rate and " rhythm. No murmurs, rubs or gallops.  Abdomen: Bowel sound +, soft, non tender, no rebound or guarding, no palpable organomegaly  Extremities: No  edema.         Assessment and Plan    1. Deviated nasal septum  - Referral to ENT    2. Post-nasal drip  -Recommended Flonase 2 spray each nose daily.  Likely due to environmental allergies.  If her cervical lymphadenopathy is not going away in 6 weeks, recommended to get ultrasound.    3. Sleep-related breathing disorder  - Referral to ENT    4. Anxiety  -Stable off fluoxetine.    5. Weight gain  - TSH WITH REFLEX TO FT4; Future  - URIC ACID; Future    6. Vitamin D deficiency  - VITAMIN D,25 HYDROXY; Future    7. Obesity (BMI 35.0-39.9 without comorbidity)  - URIC ACID; Future    8. Well adult exam  - CBC WITH DIFFERENTIAL; Future  - Comp Metabolic Panel; Future  - Lipid Profile; Future  - TSH WITH REFLEX TO FT4; Future      Followup: Return for PAP.         Signed by: Jami Polanco M.D.

## 2022-04-11 ENCOUNTER — HOSPITAL ENCOUNTER (OUTPATIENT)
Facility: MEDICAL CENTER | Age: 27
End: 2022-04-11
Attending: PHYSICIAN ASSISTANT
Payer: COMMERCIAL

## 2022-04-11 ENCOUNTER — OFFICE VISIT (OUTPATIENT)
Dept: URGENT CARE | Facility: CLINIC | Age: 27
End: 2022-04-11
Payer: COMMERCIAL

## 2022-04-11 VITALS
HEART RATE: 73 BPM | RESPIRATION RATE: 14 BRPM | TEMPERATURE: 98 F | BODY MASS INDEX: 29.41 KG/M2 | SYSTOLIC BLOOD PRESSURE: 114 MMHG | OXYGEN SATURATION: 99 % | WEIGHT: 183 LBS | DIASTOLIC BLOOD PRESSURE: 72 MMHG | HEIGHT: 66 IN

## 2022-04-11 DIAGNOSIS — N30.01 ACUTE CYSTITIS WITH HEMATURIA: ICD-10-CM

## 2022-04-11 LAB
APPEARANCE UR: NORMAL
BILIRUB UR STRIP-MCNC: NEGATIVE MG/DL
COLOR UR AUTO: YELLOW
GLUCOSE UR STRIP.AUTO-MCNC: NEGATIVE MG/DL
INT CON NEG: NEGATIVE
INT CON POS: POSITIVE
KETONES UR STRIP.AUTO-MCNC: NEGATIVE MG/DL
LEUKOCYTE ESTERASE UR QL STRIP.AUTO: NORMAL
NITRITE UR QL STRIP.AUTO: NEGATIVE
PH UR STRIP.AUTO: 5.5 [PH] (ref 5–8)
POC URINE PREGNANCY TEST: NEGATIVE
PROT UR QL STRIP: NEGATIVE MG/DL
RBC UR QL AUTO: NORMAL
SP GR UR STRIP.AUTO: 1
UROBILINOGEN UR STRIP-MCNC: 0.2 MG/DL

## 2022-04-11 PROCEDURE — 87086 URINE CULTURE/COLONY COUNT: CPT

## 2022-04-11 PROCEDURE — 99213 OFFICE O/P EST LOW 20 MIN: CPT | Performed by: PHYSICIAN ASSISTANT

## 2022-04-11 PROCEDURE — 81002 URINALYSIS NONAUTO W/O SCOPE: CPT | Performed by: PHYSICIAN ASSISTANT

## 2022-04-11 PROCEDURE — 81025 URINE PREGNANCY TEST: CPT | Performed by: PHYSICIAN ASSISTANT

## 2022-04-11 RX ORDER — PHENAZOPYRIDINE HYDROCHLORIDE 200 MG/1
200 TABLET, FILM COATED ORAL 3 TIMES DAILY
Qty: 6 TABLET | Refills: 0 | Status: SHIPPED | OUTPATIENT
Start: 2022-04-11 | End: 2022-04-13

## 2022-04-11 RX ORDER — NITROFURANTOIN 25; 75 MG/1; MG/1
100 CAPSULE ORAL EVERY 12 HOURS
Qty: 10 CAPSULE | Refills: 0 | Status: SHIPPED | OUTPATIENT
Start: 2022-04-11 | End: 2022-04-16

## 2022-04-11 ASSESSMENT — ENCOUNTER SYMPTOMS
BACK PAIN: 1
PALPITATIONS: 0
DIZZINESS: 0
SHORTNESS OF BREATH: 0
VOMITING: 0
NAUSEA: 0
CHILLS: 0
FEVER: 0
ABDOMINAL PAIN: 1
BLURRED VISION: 0

## 2022-04-11 NOTE — PROGRESS NOTES
Subjective     Hoa Acevedo is a 26 y.o. female who presents with UTI (X 1 week with pain with urination, urgency and frequency. )      UTI  This is a new problem. The current episode started 1 to 4 weeks ago. The problem occurs constantly. The problem has been unchanged. Associated symptoms include abdominal pain and urinary symptoms (dysuria, urgency, frequency). Pertinent negatives include no chest pain, chills, fever, nausea or vomiting. Associated symptoms comments: Low back pain. She has tried nothing for the symptoms.       Review of Systems   Constitutional: Negative for chills, fever and malaise/fatigue.   Eyes: Negative for blurred vision.   Respiratory: Negative for shortness of breath.    Cardiovascular: Negative for chest pain and palpitations.   Gastrointestinal: Positive for abdominal pain. Negative for nausea and vomiting.   Genitourinary: Positive for dysuria, frequency and urgency. Negative for hematuria.   Musculoskeletal: Positive for back pain.   Neurological: Negative for dizziness.           PMH:  has a past medical history of Phyllodes tumor, benign, left (12/2016) and Vitamin D deficiency (2015).  MEDS:   Current Outpatient Medications:   •  nitrofurantoin (MACROBID) 100 MG Cap, Take 1 Capsule by mouth every 12 hours for 5 days., Disp: 10 Capsule, Rfl: 0  •  phenazopyridine (PYRIDIUM) 200 MG Tab, Take 1 Tablet by mouth 3 times a day for 2 days., Disp: 6 Tablet, Rfl: 0  •  Cholecalciferol (VITAMIN D3) 2000 units Tab, Take  by mouth., Disp: , Rfl:   ALLERGIES: No Known Allergies  SURGHX:   Past Surgical History:   Procedure Laterality Date   • DENTAL EXTRACTION(S)      highschool   • LUMPECTOMY      phyliodes tumors removed from left breast 2016     SOCHX:  reports that she has quit smoking. Her smoking use included cigarettes. She smoked 0.00 packs per day. She has never used smokeless tobacco. She reports current alcohol use. She reports that she does not use drugs.  FH: Family history was  "reviewed, no pertinent findings to report      Objective     /72   Pulse 73   Temp 36.7 °C (98 °F) (Temporal)   Resp 14   Ht 1.676 m (5' 6\")   Wt 83 kg (183 lb)   LMP 04/05/2022   SpO2 99%   BMI 29.54 kg/m²      Physical Exam  Constitutional:       Appearance: Normal appearance. She is well-developed.   HENT:      Head: Normocephalic and atraumatic.      Right Ear: External ear normal.      Left Ear: External ear normal.   Eyes:      Conjunctiva/sclera: Conjunctivae normal.      Pupils: Pupils are equal, round, and reactive to light.   Cardiovascular:      Rate and Rhythm: Normal rate and regular rhythm.      Heart sounds: No murmur heard.  Pulmonary:      Effort: Pulmonary effort is normal.      Breath sounds: Normal breath sounds.   Abdominal:      Palpations: Abdomen is soft.      Tenderness: There is abdominal tenderness in the suprapubic area. There is no right CVA tenderness or left CVA tenderness.   Skin:     General: Skin is warm and dry.      Capillary Refill: Capillary refill takes less than 2 seconds.   Neurological:      Mental Status: She is alert and oriented to person, place, and time.   Psychiatric:         Behavior: Behavior normal.         Judgment: Judgment normal.            POCT Urinalysis  Lab Results   Component Value Date/Time    POCCOLOR Yellow 04/11/2022 01:55 PM    POCAPPEAR Cloudy 04/11/2022 01:55 PM    POCLEUKEST Small 04/11/2022 01:55 PM    POCNITRITE Negative 04/11/2022 01:55 PM    POCUROBILIGE 0.2 04/11/2022 01:55 PM    POCPROTEIN Negative 04/11/2022 01:55 PM    POCURPH 5.5 04/11/2022 01:55 PM    POCBLOOD Moderate 04/11/2022 01:55 PM    POCSPGRV 1.005 04/11/2022 01:55 PM    POCKETONES Negative 04/11/2022 01:55 PM    POCBILIRUBIN Negative 04/11/2022 01:55 PM    POCGLUCUA Negative 04/11/2022 01:55 PM        POCT PREGNANCY - Negative    Assessment & Plan       1. Acute cystitis with hematuria  - POCT PREGNANCY  - Urine Culture; Future  - nitrofurantoin (MACROBID) 100 MG " Cap; Take 1 Capsule by mouth every 12 hours for 5 days.  Dispense: 10 Capsule; Refill: 0  - phenazopyridine (PYRIDIUM) 200 MG Tab; Take 1 Tablet by mouth 3 times a day for 2 days.  Dispense: 6 Tablet; Refill: 0  - POCT Urinalysis  Patient does have some right-sided lower back tenderness but no CVA tenderness.  Recommend OTC NSAIDs and application of heating pads to the area.              Differential Diagnosis, natural history, and supportive care discussed. Return to the Urgent Care or follow up with your PCP if symptoms fail to resolve, or for any new or worsening symptoms. Emergency room precautions discussed. Patient and/or family appears understanding of information.

## 2022-04-14 LAB
BACTERIA UR CULT: NORMAL
SIGNIFICANT IND 70042: NORMAL
SITE SITE: NORMAL
SOURCE SOURCE: NORMAL

## 2022-10-04 ENCOUNTER — OFFICE VISIT (OUTPATIENT)
Dept: URGENT CARE | Facility: CLINIC | Age: 27
End: 2022-10-04
Payer: COMMERCIAL

## 2022-10-04 ENCOUNTER — HOSPITAL ENCOUNTER (OUTPATIENT)
Facility: MEDICAL CENTER | Age: 27
End: 2022-10-04
Attending: PHYSICIAN ASSISTANT
Payer: COMMERCIAL

## 2022-10-04 VITALS
RESPIRATION RATE: 14 BRPM | OXYGEN SATURATION: 98 % | SYSTOLIC BLOOD PRESSURE: 112 MMHG | WEIGHT: 178 LBS | TEMPERATURE: 97.8 F | HEART RATE: 85 BPM | BODY MASS INDEX: 27.94 KG/M2 | HEIGHT: 67 IN | DIASTOLIC BLOOD PRESSURE: 74 MMHG

## 2022-10-04 DIAGNOSIS — R39.9 LOWER URINARY TRACT SYMPTOMS (LUTS): ICD-10-CM

## 2022-10-04 LAB
APPEARANCE UR: ABNORMAL
BILIRUB UR STRIP-MCNC: NEGATIVE MG/DL
COLOR UR AUTO: ABNORMAL
GLUCOSE UR STRIP.AUTO-MCNC: NEGATIVE MG/DL
INT CON NEG: NEGATIVE
INT CON POS: POSITIVE
KETONES UR STRIP.AUTO-MCNC: NEGATIVE MG/DL
LEUKOCYTE ESTERASE UR QL STRIP.AUTO: ABNORMAL
NITRITE UR QL STRIP.AUTO: NEGATIVE
PH UR STRIP.AUTO: 8.5 [PH] (ref 5–8)
POC URINE PREGNANCY TEST: NEGATIVE
PROT UR QL STRIP: 30 MG/DL
RBC UR QL AUTO: ABNORMAL
SP GR UR STRIP.AUTO: 1.01
UROBILINOGEN UR STRIP-MCNC: 0.2 MG/DL

## 2022-10-04 PROCEDURE — 81002 URINALYSIS NONAUTO W/O SCOPE: CPT | Performed by: PHYSICIAN ASSISTANT

## 2022-10-04 PROCEDURE — 87086 URINE CULTURE/COLONY COUNT: CPT

## 2022-10-04 PROCEDURE — 81025 URINE PREGNANCY TEST: CPT | Performed by: PHYSICIAN ASSISTANT

## 2022-10-04 PROCEDURE — 87186 SC STD MICRODIL/AGAR DIL: CPT

## 2022-10-04 PROCEDURE — 87077 CULTURE AEROBIC IDENTIFY: CPT

## 2022-10-04 PROCEDURE — 99213 OFFICE O/P EST LOW 20 MIN: CPT | Performed by: PHYSICIAN ASSISTANT

## 2022-10-04 RX ORDER — NITROFURANTOIN 25; 75 MG/1; MG/1
100 CAPSULE ORAL 2 TIMES DAILY
Qty: 10 CAPSULE | Refills: 0 | Status: SHIPPED | OUTPATIENT
Start: 2022-10-04 | End: 2022-10-09

## 2022-10-04 ASSESSMENT — ENCOUNTER SYMPTOMS
NAUSEA: 0
VOMITING: 0
DIARRHEA: 0
ABDOMINAL PAIN: 1
HEADACHES: 0
CHILLS: 1
FEVER: 0
BACK PAIN: 0
FLANK PAIN: 0

## 2022-10-04 NOTE — PROGRESS NOTES
Subjective     Hoa Acevedo is a 27 y.o. female who presents with UTI (X 2 weeks with pain with urination, chills, urgency and frequency. )            Dysuria   This is a new problem. Episode onset: 2 week. The problem occurs intermittently. The problem has been rapidly worsening (over the past 2-3 days). The quality of the pain is described as burning. The pain is moderate. There has been no fever. She is Sexually active. Associated symptoms include chills, frequency and urgency. Pertinent negatives include no discharge, flank pain, hematuria, nausea or vomiting. She has tried nothing for the symptoms. Her past medical history is significant for recurrent UTIs.       Past Medical History:   Diagnosis Date    Phyllodes tumor, benign, left 12/2016    excised    Vitamin D deficiency 2015       Past Surgical History:   Procedure Laterality Date    DENTAL EXTRACTION(S)      highschool    LUMPECTOMY      phyliodes tumors removed from left breast 2016       Family History   Problem Relation Age of Onset    Breast Cancer Maternal Grandmother 78        also possible stroke    Anemia Mother         hysterectomy    GI Disease Father         intestinal issues    Other Sister         miscarriage, PCOS    No Known Problems Brother     Other Maternal Grandfather         tumors in heart    Lung Cancer Paternal Grandmother     Lung Cancer Paternal Grandfather      No Known Allergies    Review of Systems   Constitutional:  Positive for chills. Negative for fever and malaise/fatigue.   Gastrointestinal:  Positive for abdominal pain (lower abdominal pressure). Negative for diarrhea, nausea and vomiting.   Genitourinary:  Positive for dysuria, frequency and urgency. Negative for flank pain and hematuria.   Musculoskeletal:  Negative for back pain.   Skin:  Negative for rash.   Neurological:  Negative for headaches.      All other systems reviewed and are negative.         Objective     /74   Pulse 85   Temp 36.6 °C (97.8 °F)  "(Temporal)   Resp 14   Ht 1.702 m (5' 7\")   Wt 80.7 kg (178 lb)   LMP 09/26/2022   SpO2 98%   BMI 27.88 kg/m²      Physical Exam  Constitutional:       General: She is not in acute distress.     Appearance: Normal appearance. She is not ill-appearing.   Eyes:      Conjunctiva/sclera: Conjunctivae normal.   Cardiovascular:      Rate and Rhythm: Normal rate and regular rhythm.   Pulmonary:      Effort: Pulmonary effort is normal. No respiratory distress.      Breath sounds: No stridor. No wheezing.   Abdominal:      General: There is no distension.      Palpations: Abdomen is soft. There is no mass.      Tenderness: There is no abdominal tenderness. There is no right CVA tenderness, left CVA tenderness, guarding or rebound.   Skin:     General: Skin is warm and dry.   Neurological:      General: No focal deficit present.      Mental Status: She is alert and oriented to person, place, and time.   Psychiatric:         Mood and Affect: Mood normal.         Behavior: Behavior normal.         Thought Content: Thought content normal.         Judgment: Judgment normal.                UA- leuks- positive, blood-positive  HCG- negative          Assessment & Plan        1. Lower urinary tract symptoms (LUTS)  POCT Urinalysis    POCT Pregnancy    URINE CULTURE(NEW)    nitrofurantoin (MACROBID) 100 MG Cap          Current Outpatient Medications:     nitrofurantoin (MACROBID) 100 MG Cap, Take 1 Capsule by mouth 2 times a day for 5 days., Disp: 10 Capsule, Rfl: 0    Differential diagnoses, Supportive care, and indications for immediate follow-up discussed with patient.   Pathogenesis of diagnosis discussed including typical length and natural progression.   Instructed to return to clinic or nearest emergency department for any change in condition, further concerns, or worsening of symptoms.        The patient demonstrated a good understanding and agreed with the treatment plan.      Dipti Goodwin P.A.-C.              "

## 2022-10-05 DIAGNOSIS — R39.9 LOWER URINARY TRACT SYMPTOMS (LUTS): ICD-10-CM

## 2022-10-07 LAB
BACTERIA UR CULT: ABNORMAL
BACTERIA UR CULT: ABNORMAL
SIGNIFICANT IND 70042: ABNORMAL
SITE SITE: ABNORMAL
SOURCE SOURCE: ABNORMAL